# Patient Record
Sex: FEMALE | URBAN - METROPOLITAN AREA
[De-identification: names, ages, dates, MRNs, and addresses within clinical notes are randomized per-mention and may not be internally consistent; named-entity substitution may affect disease eponyms.]

---

## 2020-02-13 ENCOUNTER — OFFICE VISIT (OUTPATIENT)
Dept: URBAN - METROPOLITAN AREA CLINIC 32 | Facility: CLINIC | Age: 25
End: 2020-02-13
Payer: COMMERCIAL

## 2020-02-13 DIAGNOSIS — H52.223 ASTIGMATISM: Primary | ICD-10-CM

## 2020-02-13 PROCEDURE — 92004 COMPRE OPH EXAM NEW PT 1/>: CPT | Performed by: OPTOMETRIST

## 2020-02-13 ASSESSMENT — VISUAL ACUITY
OS: 20/20
OD: 20/20

## 2020-02-13 ASSESSMENT — KERATOMETRY
OS: 44.00
OD: 46.63

## 2020-02-13 ASSESSMENT — INTRAOCULAR PRESSURE
OS: 12
OD: 13

## 2020-02-25 NOTE — IMPRESSION/PLAN
Impression: Astigmatism: H52.223. Plan: Finalized new glasses Rx. Patient education on appropriate options of eye glasses. Finalized Contact lens Rx. Patient education on care and management of contact lenses. Return to clinic in 1 year for complete eye exam, refraction and contact lens fitting.

## 2024-06-21 ENCOUNTER — OFFICE VISIT (OUTPATIENT)
Dept: URBAN - METROPOLITAN AREA CLINIC 32 | Facility: CLINIC | Age: 29
End: 2024-06-21
Payer: COMMERCIAL

## 2024-06-21 DIAGNOSIS — H52.223 REGULAR ASTIGMATISM, BILATERAL: Primary | ICD-10-CM

## 2024-06-21 DIAGNOSIS — H04.123 DRY EYE SYNDROME: ICD-10-CM

## 2024-06-21 PROCEDURE — 92004 COMPRE OPH EXAM NEW PT 1/>: CPT | Performed by: OPTOMETRIST

## 2024-06-21 ASSESSMENT — INTRAOCULAR PRESSURE
OS: 12
OD: 12

## 2024-06-21 ASSESSMENT — VISUAL ACUITY
OS: 20/25
OD: 20/25